# Patient Record
Sex: FEMALE | Race: WHITE | NOT HISPANIC OR LATINO | Employment: OTHER | URBAN - METROPOLITAN AREA
[De-identification: names, ages, dates, MRNs, and addresses within clinical notes are randomized per-mention and may not be internally consistent; named-entity substitution may affect disease eponyms.]

---

## 2017-02-08 ENCOUNTER — HOSPITAL ENCOUNTER (OUTPATIENT)
Facility: MEDICAL CENTER | Age: 62
End: 2017-02-08
Attending: NURSE PRACTITIONER
Payer: MEDICARE

## 2017-02-08 LAB
ANISOCYTOSIS BLD QL SMEAR: ABNORMAL
APPEARANCE UR: CLEAR
BACTERIA #/AREA URNS HPF: ABNORMAL /HPF
BASOPHILS # BLD AUTO: 0.04 K/UL (ref 0–0.12)
BASOPHILS NFR BLD AUTO: 0.6 % (ref 0–1.8)
BILIRUB UR QL STRIP.AUTO: NEGATIVE
BNP SERPL-MCNC: 24 PG/ML (ref 0–100)
COLOR UR AUTO: YELLOW
COMMENT 1642: NORMAL
CULTURE IF INDICATED INDCX: YES UA CULTURE
EOSINOPHIL # BLD: 0.13 K/UL (ref 0–0.51)
EOSINOPHIL NFR BLD AUTO: 1.9 % (ref 0–6.9)
EPITHELIAL CELLS 1715: ABNORMAL /HPF
ERYTHROCYTE [DISTWIDTH] IN BLOOD BY AUTOMATED COUNT: 37.6 FL (ref 35.9–50)
FERRITIN SERPL-MCNC: 148.7 NG/ML (ref 10–291)
GIANT PLATELETS BLD QL SMEAR: NORMAL
GLUCOSE UR STRIP.AUTO-MCNC: NEGATIVE MG/DL
HCT VFR BLD AUTO: 44.2 % (ref 37–47)
HGB BLD-MCNC: 13.1 G/DL (ref 12–16)
IMM GRANULOCYTES # BLD AUTO: 0.06 K/UL (ref 0–0.11)
IMM GRANULOCYTES NFR BLD AUTO: 0.9 % (ref 0–0.9)
IRON SATN MFR SERPL: 19 % (ref 15–55)
IRON SERPL-MCNC: 72 UG/DL (ref 40–170)
KETONES UR STRIP.AUTO-MCNC: NEGATIVE MG/DL
LEUKOCYTE ESTERASE UR QL STRIP.AUTO: ABNORMAL
LG PLATELETS BLD QL SMEAR: NORMAL
LYMPHOCYTES # BLD: 1.37 K/UL (ref 1–4.8)
LYMPHOCYTES NFR BLD AUTO: 19.6 % (ref 22–41)
MCH RBC QN AUTO: 20 PG (ref 27–33)
MCHC RBC AUTO-ENTMCNC: 29.6 G/DL (ref 33.6–35)
MCV RBC AUTO: 67.4 FL (ref 81.4–97.8)
MICRO URNS: ABNORMAL
MICROCYTES BLD QL SMEAR: ABNORMAL
MONOCYTES # BLD: 0.48 K/UL (ref 0–0.85)
MONOCYTES NFR BLD AUTO: 6.9 % (ref 0–13.4)
MORPHOLOGY BLD-IMP: NORMAL
NEUTROPHILS # BLD: 4.91 K/UL (ref 2–7.15)
NEUTROPHILS NFR BLD AUTO: 70.1 % (ref 44–72)
NITRITE UR QL STRIP.AUTO: NEGATIVE
NRBC # BLD AUTO: 0 K/UL
NRBC BLD-RTO: 0 /100 WBC
OVALOCYTES BLD QL SMEAR: NORMAL
PH UR: 6 [PH]
PLATELET # BLD AUTO: 224 K/UL (ref 164–446)
PLATELET BLD QL SMEAR: NORMAL
PMV BLD AUTO: 10 FL (ref 9–12.9)
POIKILOCYTOSIS BLD QL SMEAR: NORMAL
PROT UR QL STRIP: NEGATIVE MG/DL
RBC # BLD AUTO: 6.56 M/UL (ref 4.2–5.4)
RBC #/AREA URNS HPF: ABNORMAL /HPF
RBC BLD AUTO: PRESENT
RBC UR QL AUTO: NEGATIVE
SP GR UR STRIP.AUTO: 1.02
T3 SERPL-MCNC: 79.7 NG/DL (ref 60–181)
T4 FREE SERPL-MCNC: 0.82 NG/DL (ref 0.53–1.43)
TIBC SERPL-MCNC: 385 UG/DL (ref 250–450)
TRANS CELLS URNS QL MICRO: ABNORMAL /HPF
TRANSFERRIN SERPL-MCNC: 277 MG/DL (ref 200–370)
TSH SERPL DL<=0.005 MIU/L-ACNC: 4.44 UIU/ML (ref 0.3–3.7)
WBC # BLD AUTO: 7 K/UL (ref 4.8–10.8)
WBC #/AREA URNS HPF: ABNORMAL /HPF

## 2017-02-08 PROCEDURE — 83550 IRON BINDING TEST: CPT

## 2017-02-08 PROCEDURE — 84443 ASSAY THYROID STIM HORMONE: CPT

## 2017-02-08 PROCEDURE — 82728 ASSAY OF FERRITIN: CPT

## 2017-02-08 PROCEDURE — 84480 ASSAY TRIIODOTHYRONINE (T3): CPT

## 2017-02-08 PROCEDURE — 83540 ASSAY OF IRON: CPT

## 2017-02-08 PROCEDURE — 87086 URINE CULTURE/COLONY COUNT: CPT

## 2017-02-08 PROCEDURE — 85025 COMPLETE CBC W/AUTO DIFF WBC: CPT

## 2017-02-08 PROCEDURE — 81001 URINALYSIS AUTO W/SCOPE: CPT

## 2017-02-08 PROCEDURE — 83880 ASSAY OF NATRIURETIC PEPTIDE: CPT

## 2017-02-08 PROCEDURE — 84439 ASSAY OF FREE THYROXINE: CPT

## 2017-02-08 PROCEDURE — 36415 COLL VENOUS BLD VENIPUNCTURE: CPT

## 2017-02-08 PROCEDURE — 84466 ASSAY OF TRANSFERRIN: CPT

## 2017-02-10 ENCOUNTER — HOSPITAL ENCOUNTER (OUTPATIENT)
Facility: MEDICAL CENTER | Age: 62
End: 2017-02-10
Attending: NURSE PRACTITIONER
Payer: MEDICARE

## 2017-02-10 LAB
25(OH)D3 SERPL-MCNC: 40 NG/ML (ref 30–100)
ALBUMIN SERPL BCP-MCNC: 4.3 G/DL (ref 3.2–4.9)
ALBUMIN/GLOB SERPL: 1.4 G/DL
ALP SERPL-CCNC: 75 U/L (ref 30–99)
ALT SERPL-CCNC: 15 U/L (ref 2–50)
ANION GAP SERPL CALC-SCNC: 7 MMOL/L (ref 0–11.9)
AST SERPL-CCNC: 16 U/L (ref 12–45)
BACTERIA UR CULT: NORMAL
BILIRUB SERPL-MCNC: 0.7 MG/DL (ref 0.1–1.5)
BUN SERPL-MCNC: 20 MG/DL (ref 8–22)
CALCIUM SERPL-MCNC: 9.8 MG/DL (ref 8.5–10.5)
CHLORIDE SERPL-SCNC: 104 MMOL/L (ref 96–112)
CHOLEST SERPL-MCNC: 233 MG/DL (ref 100–199)
CO2 SERPL-SCNC: 28 MMOL/L (ref 20–33)
CREAT SERPL-MCNC: 0.87 MG/DL (ref 0.5–1.4)
CREAT UR-MCNC: 99.8 MG/DL
EST. AVERAGE GLUCOSE BLD GHB EST-MCNC: 131 MG/DL
FOLATE SERPL-MCNC: >23.6 NG/ML
GLOBULIN SER CALC-MCNC: 3 G/DL (ref 1.9–3.5)
GLUCOSE SERPL-MCNC: 112 MG/DL (ref 65–99)
HBA1C MFR BLD: 6.2 % (ref 0–5.6)
HDLC SERPL-MCNC: 44 MG/DL
LDLC SERPL CALC-MCNC: 153 MG/DL
MAGNESIUM SERPL-MCNC: 2 MG/DL (ref 1.5–2.5)
MICROALBUMIN UR-MCNC: 4.1 MG/DL
MICROALBUMIN/CREAT UR: 41 MG/G (ref 0–30)
POTASSIUM SERPL-SCNC: 4.3 MMOL/L (ref 3.6–5.5)
PROT SERPL-MCNC: 7.3 G/DL (ref 6–8.2)
SIGNIFICANT IND 70042: NORMAL
SODIUM SERPL-SCNC: 139 MMOL/L (ref 135–145)
SOURCE SOURCE: NORMAL
TRIGL SERPL-MCNC: 179 MG/DL (ref 0–149)
VIT B12 SERPL-MCNC: >1500 PG/ML (ref 211–911)

## 2017-02-10 PROCEDURE — 82746 ASSAY OF FOLIC ACID SERUM: CPT

## 2017-02-10 PROCEDURE — 82306 VITAMIN D 25 HYDROXY: CPT

## 2017-02-10 PROCEDURE — 83735 ASSAY OF MAGNESIUM: CPT

## 2017-02-10 PROCEDURE — 82043 UR ALBUMIN QUANTITATIVE: CPT

## 2017-02-10 PROCEDURE — 80053 COMPREHEN METABOLIC PANEL: CPT

## 2017-02-10 PROCEDURE — 82607 VITAMIN B-12: CPT

## 2017-02-10 PROCEDURE — 80061 LIPID PANEL: CPT

## 2017-02-10 PROCEDURE — 84425 ASSAY OF VITAMIN B-1: CPT

## 2017-02-10 PROCEDURE — 82570 ASSAY OF URINE CREATININE: CPT

## 2017-02-10 PROCEDURE — 83036 HEMOGLOBIN GLYCOSYLATED A1C: CPT

## 2017-02-14 LAB — VIT B1 BLD-MCNC: 153 NMOL/L (ref 70–180)

## 2017-07-06 ENCOUNTER — HOSPITAL ENCOUNTER (EMERGENCY)
Facility: MEDICAL CENTER | Age: 62
End: 2017-07-06
Attending: EMERGENCY MEDICINE
Payer: MEDICARE

## 2017-07-06 VITALS
BODY MASS INDEX: 31.41 KG/M2 | RESPIRATION RATE: 16 BRPM | OXYGEN SATURATION: 97 % | TEMPERATURE: 97.6 F | HEART RATE: 88 BPM | DIASTOLIC BLOOD PRESSURE: 80 MMHG | SYSTOLIC BLOOD PRESSURE: 152 MMHG | HEIGHT: 60 IN | WEIGHT: 160 LBS
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
## 2017-07-06 PROCEDURE — 99283 EMERGENCY DEPT VISIT LOW MDM: CPT

## 2017-07-06 RX ORDER — BENZOCAINE/MENTHOL 6 MG-10 MG
1 LOZENGE MUCOUS MEMBRANE 2 TIMES DAILY
Qty: 1 TUBE | Refills: 0 | Status: SHIPPED | OUTPATIENT
Start: 2017-07-06 | End: 2018-05-10

## 2017-07-06 RX ORDER — CEFDINIR 300 MG/1
300 CAPSULE ORAL 2 TIMES DAILY
Qty: 14 CAP | Refills: 0 | Status: SHIPPED | OUTPATIENT
Start: 2017-07-06 | End: 2017-07-13

## 2017-07-06 ASSESSMENT — PAIN SCALES - GENERAL
PAINLEVEL_OUTOF10: 1
PAINLEVEL_OUTOF10: 0

## 2017-07-06 NOTE — ED PROVIDER NOTES
ED Provider Note    Scribed for Tabitha Ruiz M.D. by Gordon Vasquez. 7/6/2017  8:52 AM    Primary care provider: No primary care provider on file.  Means of arrival: walk in  History obtained from: patient  History limited by: none    CHIEF COMPLAINT  Chief Complaint   Patient presents with   • Bug Bite       HPI  Liliana Cifuentes is a 62 y.o. female who presents to the Emergency Department complaining of bug bites diffusely across her body starting 2 days ago. She reports associated mass localized to her left lower extremity. Patient states that she had her furniture moved into another part of the building that is suspected of having bed bugs. She states that after her furniture was returned, she woke up noting lesions diffusely across her body. Patient denies shortness of breath.     REVIEW OF SYSTEMS  HEENT:  No ear pain, congestion, or sore throat   PULMONARY: no dyspnea, cough, or congestion   Musculoskeletal: no swelling, deformity, pain, or joint swelling  Endocrine: no fevers, sweating, or weight loss   SKIN: Positive rash. No contusions     See history of present illness.   E.    PAST MEDICAL HISTORY   None noted    SURGICAL HISTORY  patient denies any surgical history    SOCIAL HISTORY  None noted     FAMILY HISTORY  No family history on file.    CURRENT MEDICATIONS  No current facility-administered medications for this encounter.    Current outpatient prescriptions:   •  cefdinir (OMNICEF) 300 MG Cap, Take 1 Cap by mouth 2 times a day for 7 days., Disp: 14 Cap, Rfl: 0  •  hydrocortisone 1 % Cream, Apply 1 Each to affected area(s) 2 times a day., Disp: 1 Tube, Rfl: 0    ALLERGIES  None noted    PHYSICAL EXAM  VITAL SIGNS: /80 mmHg  Pulse 88  Temp(Src) 36.4 °C (97.6 °F) (Temporal)  Resp 16  Ht 1.524 m (5')  Wt 72.576 kg (160 lb)  BMI 31.25 kg/m2  SpO2 97%    Constitutional: Well developed, Well nourished, No acute distress, Non-toxic appearance.   HEENT: Normocephalic, Atraumatic,  external  ears normal, pharynx pink,  Mucous  Membranes moist, No rhinorrhea or mucosal edema  Eyes: PERRL, EOMI, Conjunctiva normal, No discharge.   Neck: Normal range of motion, No tenderness, Supple, No stridor.   Lymphatic: No lymphadenopathy    Skin: Warm, Dry, Multiple areas of red raised lesions that are consistent with bed bugs. Bilateral posterior lower thighs with bite marks with left side swelling no erythema, fluctuants, streaking. Right posterior thigh without erythema.   Extremities: Equal, intact distal pulses, No cyanosis, clubbing or edema,  No tenderness. Multiple areas of red raised lesions that are consistent with bed bugs. Bilateral posterior lower thighs with bite marks with left side swelling no erythema, fluctuants, streaking. Right posterior thigh without erythema.   Musculoskeletal: Good range of motion in all major joints. No tenderness to palpation or major deformities noted.     DIAGNOSTIC STUDIES / PROCEDURES    COURSE & MEDICAL DECISION MAKING  Nursing notes, VS, PMSFHx reviewed in chart.    8:52 AM - Patient seen and examined at bedside. I counseled the patient on getting her residence fumigated to avoid further bites. I provided instructions on administering her discharge antibiotics and steroid cream. Patient verbalizes understanding and agreement to this plan of care.     The patient will return for new or worsening symptoms and is stable at the time of discharge.    The patient is referred to a primary physician for blood pressure management, diabetic screening, and for all other preventative health concerns.    DISPOSITION:  Patient will be discharged home in stable condition.    FOLLOW UP:  [facility] Emergency Dept  [address] [facility] [address] [phone]    As needed, If symptoms worsen    [address] [address] [phone]  Call in 1 day  for recheck      OUTPATIENT MEDICATIONS:  Discharge Medication List as  of 7/6/2017  9:19 AM      START taking these medications    Details   cefdinir (OMNICEF) 300 MG Cap Take 1 Cap by mouth 2 times a day for 7 days., Disp-14 Cap, R-0, Print Rx Paper      hydrocortisone 1 % Cream Apply 1 Each to affected area(s) 2 times a day., Disp-1 Tube, R-0, Print Rx Paper                 FINAL IMPRESSION  1. Bug bites, initial encounter          I, Gordon Vasquez (Scribe), am scribing for, and in the presence of, Tabitha Ruiz M.D..    Electronically signed by: Gordon Vasquez (Scribe), 7/6/2017    I, Tabitha uRiz M.D. personally performed the services described in this documentation, as scribed by Gordon Vasquez in my presence, and it is both accurate and complete.    The note accurately reflects work and decisions made by me.  Tabitha Ruiz  7/6/2017  1:32 PM  \

## 2017-07-06 NOTE — DISCHARGE INSTRUCTIONS
Insect Bite  Mosquitoes, flies, fleas, bedbugs, and many other insects can bite. Insect bites are different from insect stings. A sting is when venom is injected into the skin. Some insect bites can transmit infectious diseases.  SYMPTOMS   Insect bites usually turn red, swell, and itch for 2 to 4 days. They often go away on their own.  TREATMENT   Your caregiver may prescribe antibiotic medicines if a bacterial infection develops in the bite.  HOME CARE INSTRUCTIONS  · Do not scratch the bite area.  · Keep the bite area clean and dry. Wash the bite area thoroughly with soap and water.  · Put ice or cool compresses on the bite area.  ¨ Put ice in a plastic bag.  ¨ Place a towel between your skin and the bag.  ¨ Leave the ice on for 20 minutes, 4 times a day for the first 2 to 3 days, or as directed.  · You may apply a baking soda paste, cortisone cream, or calamine lotion to the bite area as directed by your caregiver. This can help reduce itching and swelling.  · Only take over-the-counter or prescription medicines as directed by your caregiver.  · If you are given antibiotics, take them as directed. Finish them even if you start to feel better.  You may need a tetanus shot if:  · You cannot remember when you had your last tetanus shot.  · You have never had a tetanus shot.  · The injury broke your skin.  If you get a tetanus shot, your arm may swell, get red, and feel warm to the touch. This is common and not a problem. If you need a tetanus shot and you choose not to have one, there is a rare chance of getting tetanus. Sickness from tetanus can be serious.  SEEK IMMEDIATE MEDICAL CARE IF:   · You have increased pain, redness, or swelling in the bite area.  · You see a red line on the skin coming from the bite.  · You have a fever.  · You have joint pain.  · You have a headache or neck pain.  · You have unusual weakness.  · You have a rash.  · You have chest pain or shortness of breath.  · You have abdominal pain,  nausea, or vomiting.  · You feel unusually tired or sleepy.  MAKE SURE YOU:   · Understand these instructions.  · Will watch your condition.  · Will get help right away if you are not doing well or get worse.     This information is not intended to replace advice given to you by your health care provider. Make sure you discuss any questions you have with your health care provider.     Document Released: 01/25/2006 Document Revised: 03/11/2013 Document Reviewed: 07/18/2012  [employer] Interactive Patient Education ©2016 Elsevier Inc.

## 2017-07-06 NOTE — ED AVS SNAPSHOT
7/6/2017    [facility]  [place] [address]    Dear [place]:    [facility] wants to ensure your discharge home is safe and you or your loved ones have had all of your questions answered regarding your care after you leave the hospital.    Below is a list of resources and contact information should you have any questions regarding your hospital stay, follow-up instructions, or active medical symptoms.    Questions or Concerns Regarding… Contact   Medical Questions Related to Your Discharge  (7 days a week, 8am-5pm) Contact a Nurse Care Coordinator   [phone]   Medical Questions Not Related to Your Discharge  (24 hours a day / 7 days a week)  Contact the Nurse Health Line   [phone]    Medications or Discharge Instructions Refer to your discharge packet   or contact your [facility] Primary Care Provider   [phone]   Follow-up Appointment(s) Schedule your appointment via Lanyon   or contact Scheduling [phone]   Billing Review your statement via Lanyon  or contact Billing [phone]   Medical Records Review your records via Lanyon   or contact Medical Records [phone]     You may receive a telephone call within two days of discharge. This call is to make certain you understand your discharge instructions and have the opportunity to have any questions answered. You can also easily access your medical information, test results and upcoming appointments via the Lanyon free online health management tool. You can learn more and sign up at [employer]/Lanyon. For assistance setting up your Lanyon account, please call [phone].    Once again, we want to ensure your discharge home is safe and that you have a clear understanding of any next steps in your care. If you have any questions or concerns, please do not hesitate to contact us, we are here for you. Thank you for choosing [facility] for your healthcare needs.    Sincerely,    Your [facility] Healthcare Team

## 2017-07-06 NOTE — ED AVS SNAPSHOT
Citelighter Access Code: [email or web address]  Expires: 8/5/2017  9:01 AM    Your email address is not on file at [facility].  Email Addresses are required for you to sign up for Citelighter, please contact [phone] to verify your personal information and to provide your email address prior to attempting to register for Citelighter.    [place] [address]    Storefrontt  A secure, online tool to manage your health information     [employer]’s Citelighter® is a secure, online tool that connects you to your personalized health information from the privacy of your home -- day or night - making it very easy for you to manage your healthcare. Once the activation process is completed, you can even access your medical information using the Citelighter hao, which is available for free in the Apple Hao store or Google Play store.     To learn more about Citelighter, visit [email or web address]    There are two levels of access available (as shown below):   My Chart Features  [facility] Primary Care Doctor [facility]  Specialists [facility]  Urgent  Care Non-[facility] Primary Care Doctor   Email your healthcare team securely and privately 24/7 X X X    Manage appointments: schedule your next appointment; view details of past/upcoming appointments X      Request prescription refills. X      View recent personal medical records, including lab and immunizations X X X X   View health record, including health history, allergies, medications X X X X   Read reports about your outpatient visits, procedures, consult and ER notes X X X X   See your discharge summary, which is a recap of your hospital and/or ER visit that includes your diagnosis, lab results, and care plan X X  X     How to register for Storefrontt:  Once your e-mail address has been verified, follow the following steps to sign up for Storefrontt.     1. Go to  [email or web address]  2. Click on the Sign Up Now box, which takes you to the New Member Sign Up page. You will  need to provide the following information:  a. Enter your [employer] Access Code exactly as it appears at the top of this page. (You will not need to use this code after you’ve completed the sign-up process. If you do not sign up before the expiration date, you must request a new code.)   b. Enter your date of birth.   c. Enter your home email address.   d. Click Submit, and follow the next screen’s instructions.  3. Create a [employer] ID. This will be your [employer] login ID and cannot be changed, so think of one that is secure and easy to remember.  4. Create a [employer] password. You can change your password at any time.  5. Enter your Password Reset Question and Answer. This can be used at a later time if you forget your password.   6. Enter your e-mail address. This allows you to receive e-mail notifications when new information is available in [employer].  7. Click Sign Up. You can now view your health information.    For assistance activating your [employer] account, call [phone]

## 2017-07-06 NOTE — ED NOTES
Pt c/o bed bugs bites. Pt reports seeing bed bugs at her house since new furniture arrived. Pt advised to return to triage nurse for any changes or concerns.

## 2017-07-06 NOTE — ED AVS SNAPSHOT
Home Care Instructions                                                                                                                Liliana Cifuentes   MRN: [id number]    Department:  [facility], Emergency Dept   Date of Visit:  7/6/2017            [facility], Emergency Dept    [address] [facility] [address]    Phone:  [phone]      You were seen by     Tabitha Ruiz M.D.      Your Diagnosis Was     Bug bites, initial encounter     W57.XXXA       Follow-up Information     1. Follow up with [facility], Emergency Dept.    Specialty:  Emergency Medicine    Why:  As needed, If symptoms worsen    Contact information    [address] [facility] [address] [phone]        2. Follow up with [facility]. Call in 1 day.    Why:  for recheck    Contact information    [address] [place] [address] [phone]      Medication Information     Review all of your home medications and newly ordered medications with your primary doctor and/or pharmacist as soon as possible. Follow medication instructions as directed by your doctor and/or pharmacist.     Please keep your complete medication list with you and share with your physician. Update the information when medications are discontinued, doses are changed, or new medications (including over-the-counter products) are added; and carry medication information at all times in the event of emergency situations.               Medication List      START taking these medications        Instructions    Morning Afternoon Evening Bedtime    cefdinir 300 MG Caps   Commonly known as:  OMNICEF        Take 1 Cap by mouth 2 times a day for 7 days.   Dose:  300 mg                        hydrocortisone 1 % Crea        Apply 1 Each to affected area(s) 2 times a day.   Dose:  1 Each                             Where to Get Your Medications      You can get these medications from any pharmacy     Bring a paper prescription for each of these medications    - cefdinir 300 MG Caps  - hydrocortisone 1 % Crea              Discharge Instructions       Insect Bite  Mosquitoes, flies, fleas, bedbugs, and many other insects can bite. Insect bites are different from insect stings. A sting is when venom is injected into the skin. Some insect bites can transmit infectious diseases.  SYMPTOMS   Insect bites usually turn red, swell, and itch for 2 to 4 days. They often go away on their own.  TREATMENT   Your caregiver may prescribe antibiotic medicines if a bacterial infection develops in the bite.  HOME CARE INSTRUCTIONS  · Do not scratch the bite area.  · Keep the bite area clean and dry. Wash the bite area thoroughly with soap and water.  · Put ice or cool compresses on the bite area.  ¨ Put ice in a plastic bag.  ¨ Place a towel between your skin and the bag.  ¨ Leave the ice on for 20 minutes, 4 times a day for the first 2 to 3 days, or as directed.  · You may apply a baking soda paste, cortisone cream, or calamine lotion to the bite area as directed by your caregiver. This can help reduce itching and swelling.  · Only take over-the-counter or prescription medicines as directed by your caregiver.  · If you are given antibiotics, take them as directed. Finish them even if you start to feel better.  You may need a tetanus shot if:  · You cannot remember when you had your last tetanus shot.  · You have never had a tetanus shot.  · The injury broke your skin.  If you get a tetanus shot, your arm may swell, get red, and feel warm to the touch. This is common and not a problem. If you need a tetanus shot and you choose not to have one, there is a rare chance of getting tetanus. Sickness from tetanus can be serious.  SEEK IMMEDIATE MEDICAL CARE IF:   · You have increased pain, redness, or swelling in the bite area.  · You see a red line on the skin coming from the bite.  · You have a fever.  · You have joint pain.  · You  have a headache or neck pain.  · You have unusual weakness.  · You have a rash.  · You have chest pain or shortness of breath.  · You have abdominal pain, nausea, or vomiting.  · You feel unusually tired or sleepy.  MAKE SURE YOU:   · Understand these instructions.  · Will watch your condition.  · Will get help right away if you are not doing well or get worse.     This information is not intended to replace advice given to you by your health care provider. Make sure you discuss any questions you have with your health care provider.     Document Released: 01/25/2006 Document Revised: 03/11/2013 Document Reviewed: 07/18/2012  [employer] Interactive Patient Education ©2016 Elsevier Inc.            Patient Information     Patient Information    Following emergency treatment: all patient requiring follow-up care must return either to a private physician or a clinic if your condition worsens before you are able to obtain further medical attention, please return to the emergency room.     Billing Information    At [facility], we work to make the billing process streamlined for our patients.  Our Representatives are here to answer any questions you may have regarding your hospital bill.  If you have insurance coverage and have supplied your insurance information to us, we will submit a claim to your insurer on your behalf.  Should you have any questions regarding your bill, we can be reached online or by phone as follows:  Online: You are able pay your bills online or live chat with our representatives about any billing questions you may have. We are here to help Monday - Friday from 8:00am to 7:30pm and 9:00am - 12:00pm on Saturdays.  Please visit [email or web address]  for more information.   Phone:  [phone] or [phone]    Please note that your emergency physician, surgeon, pathologist, radiologist, anesthesiologist, and other specialists are not employed by [facility] and will  therefore bill separately for their services.  Please contact them directly for any questions concerning their bills at the numbers below:     Emergency Physician Services:  [phone]  [facility]:  [phone]  [facility]:  [phone]  [facility]:  [phone]    1. Your final bill may vary from the amount quoted upon discharge if all procedures are not complete at that time, or if your doctor has additional procedures of which we are not aware. You will receive an additional bill if you return to the Emergency Department at [facility] for suture removal regardless of the facility of which the sutures were placed.     2. Please arrange for settlement of this account at the emergency registration.    3. All self-pay accounts are due in full at the time of treatment.  If you are unable to meet this obligation then payment is expected within 4-5 days.     4. If you have had radiology studies (CT, X-ray, Ultrasound, MRI), you have received a preliminary result during your emergency department visit. Please contact the radiology department [phone] to receive a copy of your final result. Please discuss the Final result with your primary physician or with the follow up physician provided.     Crisis Hotline:  [place] Crisis Hotline:  7-404-WJNCYAH or 1-139.447.3723  [place] Crisis Hotline:    1-668.182.3457 or 189-274-0359         ED Discharge Follow Up Questions    1. In order to provide you with very good care, we would like to follow up with a phone call in the next few days.  May we have your permission to contact you?     YES /  NO    2. What is the best phone number to call you? (       )_____-__________    3. What is the best time to call you?      Morning  /  Afternoon  /  Evening                   Patient Signature:  ____________________________________________________________    Date:  ____________________________________________________________

## 2017-11-16 ENCOUNTER — HOSPITAL ENCOUNTER (OUTPATIENT)
Dept: LAB | Facility: MEDICAL CENTER | Age: 62
End: 2017-11-16
Attending: NURSE PRACTITIONER
Payer: MEDICARE

## 2017-11-16 LAB
25(OH)D3 SERPL-MCNC: 37 NG/ML (ref 30–100)
ANISOCYTOSIS BLD QL SMEAR: ABNORMAL
BASOPHILS # BLD AUTO: 1.2 % (ref 0–1.8)
BASOPHILS # BLD: 0.06 K/UL (ref 0–0.12)
COMMENT 1642: NORMAL
EOSINOPHIL # BLD AUTO: 0.14 K/UL (ref 0–0.51)
EOSINOPHIL NFR BLD: 2.8 % (ref 0–6.9)
ERYTHROCYTE [DISTWIDTH] IN BLOOD BY AUTOMATED COUNT: 38 FL (ref 35.9–50)
HCT VFR BLD AUTO: 41.2 % (ref 37–47)
HGB BLD-MCNC: 12.3 G/DL (ref 12–16)
IMM GRANULOCYTES # BLD AUTO: 0.03 K/UL (ref 0–0.11)
IMM GRANULOCYTES NFR BLD AUTO: 0.6 % (ref 0–0.9)
LG PLATELETS BLD QL SMEAR: NORMAL
LYMPHOCYTES # BLD AUTO: 1.14 K/UL (ref 1–4.8)
LYMPHOCYTES NFR BLD: 23 % (ref 22–41)
MCH RBC QN AUTO: 19.9 PG (ref 27–33)
MCHC RBC AUTO-ENTMCNC: 29.9 G/DL (ref 33.6–35)
MCV RBC AUTO: 66.6 FL (ref 81.4–97.8)
MICROCYTES BLD QL SMEAR: ABNORMAL
MONOCYTES # BLD AUTO: 0.31 K/UL (ref 0–0.85)
MONOCYTES NFR BLD AUTO: 6.3 % (ref 0–13.4)
MORPHOLOGY BLD-IMP: NORMAL
NEUTROPHILS # BLD AUTO: 3.28 K/UL (ref 2–7.15)
NEUTROPHILS NFR BLD: 66.1 % (ref 44–72)
NRBC # BLD AUTO: 0 K/UL
NRBC BLD AUTO-RTO: 0 /100 WBC
OVALOCYTES BLD QL SMEAR: NORMAL
PLATELET # BLD AUTO: 215 K/UL (ref 164–446)
PLATELET BLD QL SMEAR: NORMAL
PMV BLD AUTO: 10.3 FL (ref 9–12.9)
POIKILOCYTOSIS BLD QL SMEAR: NORMAL
RBC # BLD AUTO: 6.19 M/UL (ref 4.2–5.4)
RBC BLD AUTO: PRESENT
T3FREE SERPL-MCNC: 2.97 PG/ML (ref 2.4–4.2)
T4 FREE SERPL-MCNC: 0.8 NG/DL (ref 0.53–1.43)
T4 SERPL-MCNC: 6.1 UG/DL (ref 4–12)
TSH SERPL DL<=0.005 MIU/L-ACNC: 4.72 UIU/ML (ref 0.3–3.7)
WBC # BLD AUTO: 5 K/UL (ref 4.8–10.8)

## 2017-11-16 PROCEDURE — 84481 FREE ASSAY (FT-3): CPT

## 2017-11-16 PROCEDURE — 82306 VITAMIN D 25 HYDROXY: CPT

## 2017-11-16 PROCEDURE — 36415 COLL VENOUS BLD VENIPUNCTURE: CPT

## 2017-11-16 PROCEDURE — 84439 ASSAY OF FREE THYROXINE: CPT

## 2017-11-16 PROCEDURE — 84443 ASSAY THYROID STIM HORMONE: CPT

## 2017-11-16 PROCEDURE — 85025 COMPLETE CBC W/AUTO DIFF WBC: CPT

## 2018-02-14 ENCOUNTER — APPOINTMENT (OUTPATIENT)
Dept: LAB | Facility: MEDICAL CENTER | Age: 63
End: 2018-02-14
Attending: FAMILY MEDICINE
Payer: MEDICARE

## 2018-04-27 ENCOUNTER — HOSPITAL ENCOUNTER (OUTPATIENT)
Dept: LAB | Facility: MEDICAL CENTER | Age: 63
End: 2018-04-27
Attending: NURSE PRACTITIONER
Payer: MEDICARE

## 2018-04-27 LAB
25(OH)D3 SERPL-MCNC: 43 NG/ML (ref 30–100)
ALBUMIN SERPL BCP-MCNC: 4.3 G/DL (ref 3.2–4.9)
ALBUMIN/GLOB SERPL: 1.7 G/DL
ALP SERPL-CCNC: 91 U/L (ref 30–99)
ALT SERPL-CCNC: 17 U/L (ref 2–50)
ANION GAP SERPL CALC-SCNC: 8 MMOL/L (ref 0–11.9)
AST SERPL-CCNC: 17 U/L (ref 12–45)
BASOPHILS # BLD AUTO: 1 % (ref 0–1.8)
BASOPHILS # BLD: 0.05 K/UL (ref 0–0.12)
BILIRUB SERPL-MCNC: 0.9 MG/DL (ref 0.1–1.5)
BUN SERPL-MCNC: 18 MG/DL (ref 8–22)
CALCIUM SERPL-MCNC: 9.8 MG/DL (ref 8.5–10.5)
CHLORIDE SERPL-SCNC: 107 MMOL/L (ref 96–112)
CHOLEST SERPL-MCNC: 187 MG/DL (ref 100–199)
CO2 SERPL-SCNC: 26 MMOL/L (ref 20–33)
COMMENT 1642: NORMAL
CREAT SERPL-MCNC: 0.78 MG/DL (ref 0.5–1.4)
CREAT UR-MCNC: 222.5 MG/DL
EOSINOPHIL # BLD AUTO: 0.13 K/UL (ref 0–0.51)
EOSINOPHIL NFR BLD: 2.7 % (ref 0–6.9)
ERYTHROCYTE [DISTWIDTH] IN BLOOD BY AUTOMATED COUNT: 38.5 FL (ref 35.9–50)
EST. AVERAGE GLUCOSE BLD GHB EST-MCNC: 131 MG/DL
GLOBULIN SER CALC-MCNC: 2.6 G/DL (ref 1.9–3.5)
GLUCOSE SERPL-MCNC: 101 MG/DL (ref 65–99)
HBA1C MFR BLD: 6.2 % (ref 0–5.6)
HCT VFR BLD AUTO: 42.6 % (ref 37–47)
HDLC SERPL-MCNC: 42 MG/DL
HGB BLD-MCNC: 12.5 G/DL (ref 12–16)
IMM GRANULOCYTES # BLD AUTO: 0.02 K/UL (ref 0–0.11)
IMM GRANULOCYTES NFR BLD AUTO: 0.4 % (ref 0–0.9)
LDLC SERPL CALC-MCNC: 114 MG/DL
LYMPHOCYTES # BLD AUTO: 1.26 K/UL (ref 1–4.8)
LYMPHOCYTES NFR BLD: 26.1 % (ref 22–41)
MCH RBC QN AUTO: 19.6 PG (ref 27–33)
MCHC RBC AUTO-ENTMCNC: 29.3 G/DL (ref 33.6–35)
MCV RBC AUTO: 66.8 FL (ref 81.4–97.8)
MICROALBUMIN UR-MCNC: 4.9 MG/DL
MICROALBUMIN/CREAT UR: 22 MG/G (ref 0–30)
MICROCYTES BLD QL SMEAR: ABNORMAL
MONOCYTES # BLD AUTO: 0.45 K/UL (ref 0–0.85)
MONOCYTES NFR BLD AUTO: 9.3 % (ref 0–13.4)
MORPHOLOGY BLD-IMP: NORMAL
NEUTROPHILS # BLD AUTO: 2.92 K/UL (ref 2–7.15)
NEUTROPHILS NFR BLD: 60.5 % (ref 44–72)
NRBC # BLD AUTO: 0 K/UL
NRBC BLD-RTO: 0 /100 WBC
OVALOCYTES BLD QL SMEAR: NORMAL
PLATELET # BLD AUTO: 198 K/UL (ref 164–446)
PLATELET BLD QL SMEAR: NORMAL
PMV BLD AUTO: 10.3 FL (ref 9–12.9)
POIKILOCYTOSIS BLD QL SMEAR: NORMAL
POTASSIUM SERPL-SCNC: 4.4 MMOL/L (ref 3.6–5.5)
PROT SERPL-MCNC: 6.9 G/DL (ref 6–8.2)
RBC # BLD AUTO: 6.38 M/UL (ref 4.2–5.4)
RBC BLD AUTO: PRESENT
SODIUM SERPL-SCNC: 141 MMOL/L (ref 135–145)
T3FREE SERPL-MCNC: 3.77 PG/ML (ref 2.4–4.2)
T4 FREE SERPL-MCNC: 1.56 NG/DL (ref 0.53–1.43)
TRIGL SERPL-MCNC: 153 MG/DL (ref 0–149)
TSH SERPL DL<=0.005 MIU/L-ACNC: 0.01 UIU/ML (ref 0.38–5.33)
WBC # BLD AUTO: 4.8 K/UL (ref 4.8–10.8)

## 2018-04-27 PROCEDURE — 85025 COMPLETE CBC W/AUTO DIFF WBC: CPT

## 2018-04-27 PROCEDURE — 84481 FREE ASSAY (FT-3): CPT

## 2018-04-27 PROCEDURE — 82570 ASSAY OF URINE CREATININE: CPT

## 2018-04-27 PROCEDURE — 80061 LIPID PANEL: CPT

## 2018-04-27 PROCEDURE — 82306 VITAMIN D 25 HYDROXY: CPT

## 2018-04-27 PROCEDURE — 84439 ASSAY OF FREE THYROXINE: CPT

## 2018-04-27 PROCEDURE — 84443 ASSAY THYROID STIM HORMONE: CPT

## 2018-04-27 PROCEDURE — 83735 ASSAY OF MAGNESIUM: CPT

## 2018-04-27 PROCEDURE — 83036 HEMOGLOBIN GLYCOSYLATED A1C: CPT

## 2018-04-27 PROCEDURE — 36415 COLL VENOUS BLD VENIPUNCTURE: CPT

## 2018-04-27 PROCEDURE — 83785 ASSAY OF MANGANESE: CPT

## 2018-04-27 PROCEDURE — 80053 COMPREHEN METABOLIC PANEL: CPT

## 2018-04-27 PROCEDURE — 82043 UR ALBUMIN QUANTITATIVE: CPT

## 2018-04-30 LAB — MAGNESIUM RBC-SCNC: 2 MMOL/L (ref 1.5–3.1)

## 2018-05-01 LAB — TEST NAME 95000: NORMAL

## 2018-05-10 ENCOUNTER — OFFICE VISIT (OUTPATIENT)
Dept: MEDICAL GROUP | Facility: MEDICAL CENTER | Age: 63
End: 2018-05-10
Payer: MEDICARE

## 2018-05-10 VITALS
HEIGHT: 60 IN | TEMPERATURE: 98.6 F | OXYGEN SATURATION: 94 % | DIASTOLIC BLOOD PRESSURE: 98 MMHG | SYSTOLIC BLOOD PRESSURE: 164 MMHG | RESPIRATION RATE: 16 BRPM | HEART RATE: 78 BPM | WEIGHT: 184 LBS | BODY MASS INDEX: 36.12 KG/M2

## 2018-05-10 DIAGNOSIS — D56.9 THALASSEMIA, UNSPECIFIED TYPE: ICD-10-CM

## 2018-05-10 DIAGNOSIS — I10 ESSENTIAL HYPERTENSION: ICD-10-CM

## 2018-05-10 DIAGNOSIS — K04.7 DENTAL ABSCESS: ICD-10-CM

## 2018-05-10 DIAGNOSIS — R73.03 PRE-DIABETES: ICD-10-CM

## 2018-05-10 DIAGNOSIS — E03.9 HYPOTHYROIDISM, UNSPECIFIED TYPE: ICD-10-CM

## 2018-05-10 PROCEDURE — 99204 OFFICE O/P NEW MOD 45 MIN: CPT | Performed by: NURSE PRACTITIONER

## 2018-05-10 RX ORDER — LEVOTHYROXINE SODIUM 175 UG/1
175 TABLET ORAL
Qty: 30 TAB | Refills: 3 | Status: SHIPPED | OUTPATIENT
Start: 2018-05-10 | End: 2018-09-19 | Stop reason: SDUPTHER

## 2018-05-10 RX ORDER — LEVOTHYROXINE SODIUM 0.2 MG/1
200 TABLET ORAL
COMMUNITY
End: 2018-05-10

## 2018-05-10 RX ORDER — AMOXICILLIN 500 MG/1
500 CAPSULE ORAL 3 TIMES DAILY
Qty: 30 CAP | Refills: 0 | Status: SHIPPED | OUTPATIENT
Start: 2018-05-10 | End: 2021-04-19

## 2018-05-10 RX ORDER — LISINOPRIL 20 MG/1
20 TABLET ORAL DAILY
Qty: 30 TAB | Refills: 3 | Status: SHIPPED | OUTPATIENT
Start: 2018-05-10 | End: 2019-01-04 | Stop reason: SDUPTHER

## 2018-05-10 NOTE — LETTER
[facility]  Rhonda Valerio A.P.R.N.  [address]  [facility] [address]  Fax: [phone]   Authorization for Release/Disclosure of   Protected Health Information   Name: GAUTAM JEONG : 1955 SSN: [id number]   Address: [facility] [address] Phone:    [phone] (home)    I authorize the entity listed below to release/disclose the PHI below to:   [facility]/Rhonda Valerio, A.P.R.N. and Rhonda Valerio A.P.R.N.   Provider or Entity Name:  Dr. Bassem Mckeon   Address   City, [facility]   Phone:      Fax:     Reason for request: continuity of care   Information to be released:    [  ] LAST COLONOSCOPY,  including any PATH REPORT and follow-up  [  ] LAST FIT/COLOGUARD RESULT [  ] LAST DEXA  [  ] LAST MAMMOGRAM  [  ] LAST PAP  [  ] LAST LABS [  ] RETINA EXAM REPORT  [  ] IMMUNIZATION RECORDS  [ x ] Release all info      [  ] Check here and initial the line next to each item to release ALL health information INCLUDING  _____ Care and treatment for drug and / or alcohol abuse  _____ HIV testing, infection status, or AIDS  _____ Genetic Testing    DATES OF SERVICE OR TIME PERIOD TO BE DISCLOSED: _____________  I understand and acknowledge that:  * This Authorization may be revoked at any time by you in writing, except if your health information has already been used or disclosed.  * Your health information that will be used or disclosed as a result of you signing this authorization could be re-disclosed by the recipient. If this occurs, your re-disclosed health information may no longer be protected by State or Federal laws.  * You may refuse to sign this Authorization. Your refusal will not affect your ability to obtain treatment.  * This Authorization becomes effective upon signing and will  on (date) __________.      If no date is indicated, this Authorization will  one (1) year from the signature date.    Name: Gautam Velez  Esau    Signature:   Date:     5/10/2018       PLEASE FAX REQUESTED RECORDS BACK TO: [phone]

## 2018-05-11 ENCOUNTER — HOSPITAL ENCOUNTER (OUTPATIENT)
Dept: LAB | Facility: MEDICAL CENTER | Age: 63
End: 2018-05-11
Attending: NURSE PRACTITIONER
Payer: MEDICARE

## 2018-05-11 DIAGNOSIS — E03.9 HYPOTHYROIDISM, UNSPECIFIED TYPE: ICD-10-CM

## 2018-05-11 LAB
T4 FREE SERPL-MCNC: 1.33 NG/DL (ref 0.53–1.43)
TSH SERPL DL<=0.005 MIU/L-ACNC: 0.01 UIU/ML (ref 0.38–5.33)

## 2018-05-11 PROCEDURE — 84443 ASSAY THYROID STIM HORMONE: CPT

## 2018-05-11 PROCEDURE — 84439 ASSAY OF FREE THYROXINE: CPT

## 2018-05-11 PROCEDURE — 36415 COLL VENOUS BLD VENIPUNCTURE: CPT

## 2018-05-11 RX ORDER — MULTIVIT-MIN/IRON/FOLIC ACID/K 18-600-40
CAPSULE ORAL
Qty: 30 CAP | COMMUNITY
Start: 2018-05-11

## 2018-05-11 NOTE — PROGRESS NOTES
"CC: Establish care, thyroid medication refill      Liliana Cifuentes is a 63 y.o. female here to establish care and to discuss the evaluation and management of:    1. Dental abscess  Patient states that she has dental abscesses and has used amoxicillin in the past. States that she can't afford a dentist at this time. Requesting antibiotics. Denies any fever, chills, nausea or vomiting, difficulty swallowing or severe facial swelling.     2. Hypothyroidism, unspecified type  Patient states that she has hypothyroidism due to a partial lobectomy. Patient states that she likes to take the natural thyroid however can't afford it. So then she also will alternate with levothyroxine 200 µg. Patient is asking to have a prescription for both of them so she can go back and forth between the 2 of them. Discussed with patient that I will not be prescribing her Levothyroxine and [place] Thyroid and have her alternate as that is not appropriate management.     3. Thalassemia, unspecified type  Patient states she has thalassemia. Patient states that she keeps a handle on her vitamins and minerals that that can be in control. Patient is very focused on having her manganese checked again all though it was just checked in April 27 and it was normal range.    4. Essential hypertension  Patient states that she does have high blood pressure and she was taking lisinopril for this however patient states that she seems to be able to manage her blood pressure with \"vitamins and minerals and diet\" patient states she can take a lot of vitamin D and manganese and her blood pressure seems to come down. Denies any chest pain, shortness of breath or dizziness.    5. Pre-diabetes  Patient states that she does have prediabetes. Most recent A1c back in April of this year was 6.2%. Does not exercise or take any medication for this.      ROS:  Denies any Headache, Blurred Vision, Confusion Chest pain,  Shortness of breath,  Abdominal pain, Changes " of bowel or bladder, Lower ext edema, Fevers, Nights sweats, Weight Changes, Focal weakness or numbness.  All other systems are negative.dental pain      Current Outpatient Prescriptions:   •  Cholecalciferol (VITAMIN D) 2000 units Cap, Take  by mouth., Disp: 30 Cap, Rfl:   •  lisinopril (PRINIVIL) 20 MG Tab, Take 1 Tab by mouth every day., Disp: 30 Tab, Rfl: 3  •  amoxicillin (AMOXIL) 500 MG Cap, Take 1 Cap by mouth 3 times a day., Disp: 30 Cap, Rfl: 0  •  levothyroxine (SYNTHROID) 175 MCG Tab, Take 1 Tab by mouth Every morning on an empty stomach., Disp: 30 Tab, Rfl: 3    No Known Allergies    Past Medical History:   Diagnosis Date   • Abscessed tooth    • Dyslipidemia    • Fibromyalgia    • HTN (hypertension)    • Hypothyroid    • Knee injury     bilateral knee cartilage damage   • Rotator cuff injury     bilateral    • Thalassemia minor      Past Surgical History:   Procedure Laterality Date   • PARATHYROIDECTOMY      2 removed, 2 remaining   • THYROID LOBECTOMY Left    • TONSILLECTOMY       Family History   Problem Relation Age of Onset   • Family history unknown: Yes     Social History     Social History   • Marital status: Single     Spouse name: N/A   • Number of children: N/A   • Years of education: N/A     Occupational History   • Not on file.     Social History Main Topics   • Smoking status: Never Smoker   • Smokeless tobacco: Never Used   • Alcohol use No   • Drug use: No   • Sexual activity: Not on file     Other Topics Concern   • Not on file     Social History Narrative   • No narrative on file       Objective:     Vitals: BP (!) 164/98   Pulse 78   Temp 37 °C (98.6 °F)   Resp 16   Ht 1.524 m (5')   Wt 83.5 kg (184 lb)   SpO2 94%   BMI 35.94 kg/m²      General: Alert, pleasant, NAD  HEENT:  Normocephalic.  Multiple dental cavities, dental decay and broken teeth  Neck supple.  No thyromegaly or masses palpated. No cervical or supraclavicular lymphadenopathy.  Heart:  Regular rate and rhythm.   S1 and S2 normal.  No murmurs appreciated.  Respiratory:  Normal respiratory effort.  Clear to auscultation bilaterally.   Skin:  Warm, dry, no rashes  Musculoskeletal:  Gait is normal.  Moves all extremities well.  Extremities:   No leg edema.  Neurological: No tremors, sensation grossly intact  Psych:  Affect/mood is normal, judgement is poor, memory is intact, grooming is appropriate. Paranoid      Assessment and Plan.   63 y.o. female to establish care and discuss the following    1. Dental abscess  Patient does have multiple dental decay, broken teeth, multiple dental caries. Advised patient to establish with a dentist at some time for dental extraction. We will prescribe her amoxicillin for 10 days.  - amoxicillin (AMOXIL) 500 MG Cap; Take 1 Cap by mouth 3 times a day.  Dispense: 30 Cap; Refill: 0    2. Hypothyroidism, unspecified type  Last TSH not controlled 0.010. Discussed with patient that she's been overcorrected and I will not prescribe her the 200 mcg and she is asking for. I will prescribe her the 175 to levothyroxine and repeat her labs in 6-8 weeks. I also reminded her that is not appropriate to switch back and forth between medications levothyroxine and [place] Thyroid.  - levothyroxine (SYNTHROID) 175 MCG Tab; Take 1 Tab by mouth Every morning on an empty stomach.  Dispense: 30 Tab; Refill: 3  - TSH WITH REFLEX TO FT4; Future    3. Thalassemia, unspecified type  Chronic. Not anemic. Patient is very focused on having her manganese and her vitamin D checked.    4. Essential hypertension  Not controlled. Patient seems to think that she can take multivitamins, vitamin D and manganese to lower blood pressure. Reviewed with patient that her blood pressure is not in control and this can lead to cardiovascular event such as stroke and heart attack and chronic kidney failure. Will refill her lisinopril and advised her to take this daily.  - lisinopril (PRINIVIL) 20 MG Tab; Take 1 Tab by mouth every day.   "Dispense: 30 Tab; Refill: 3    5. Pre-diabetes  Most recent A1c in April was 6.2%. Encouraged diet modification and exercise.      Health Maintenance: Patient refuses to sign the release of records from her previous provider as that she states that \"she would like to see her records before we are able to see them\".    Return in about 3 months (around 8/10/2018).          Rhonda COSTELLO.  "

## 2018-05-12 DIAGNOSIS — E03.9 HYPOTHYROIDISM, UNSPECIFIED TYPE: ICD-10-CM

## 2018-08-27 ENCOUNTER — PATIENT OUTREACH (OUTPATIENT)
Dept: HEALTH INFORMATION MANAGEMENT | Facility: OTHER | Age: 63
End: 2018-08-27

## 2018-08-27 NOTE — PROGRESS NOTES
Outcome: Left Message    Please transfer to Patient Outreach Team at [phone] when patient returns call.    WebIZ Checked & Epic Updated:  yes    [facility] Verified: yes    Attempt # 1

## 2018-09-07 NOTE — PROGRESS NOTES
Outcome: Left Message    Please transfer to Patient Outreach Team at [phone] when patient returns call.    Attempt # 2

## 2018-09-19 DIAGNOSIS — E03.9 HYPOTHYROIDISM, UNSPECIFIED TYPE: ICD-10-CM

## 2018-09-19 RX ORDER — LEVOTHYROXINE SODIUM 175 UG/1
175 TABLET ORAL
Qty: 30 TAB | Refills: 0 | Status: SHIPPED | OUTPATIENT
Start: 2018-09-19 | End: 2021-04-19

## 2018-09-22 NOTE — PROGRESS NOTES
Outcome: Left Message    Please transfer to Patient Outreach Team at [phone] when patient returns call.    Attempt # 4    Health Maintenance Due   Topic Date Due   • Annual Wellness Visit  1955   • IMM DTaP/Tdap/Td Vaccine (1 - Tdap) 03/22/1972   • PAP SMEAR  03/22/1974   • MAMMOGRAM  03/22/1993   • COLONOSCOPY  03/22/2003   • IMM ZOSTER VACCINES (1 of 2) 03/22/2003   • BONE DENSITY  03/22/2018   • IMM PNEUMOCOCCAL 65+ (ADULT) HIGH/HIGHEST RISK SERIES (1 of 2 - PCV13) 03/22/2018   • IMM INFLUENZA (1) 09/01/2018

## 2018-09-27 NOTE — PROGRESS NOTES
Outcome: Left Message    Please transfer to Patient Outreach Team at [phone] when patient returns call.        Attempt # 5

## 2019-01-04 DIAGNOSIS — I10 ESSENTIAL HYPERTENSION: ICD-10-CM

## 2019-01-04 RX ORDER — LISINOPRIL 20 MG/1
TABLET ORAL
Qty: 30 TAB | Refills: 2 | Status: SHIPPED | OUTPATIENT
Start: 2019-01-04 | End: 2019-03-21 | Stop reason: SDUPTHER

## 2019-03-21 DIAGNOSIS — I10 ESSENTIAL HYPERTENSION: ICD-10-CM

## 2019-03-21 RX ORDER — LISINOPRIL 20 MG/1
20 TABLET ORAL
Qty: 90 TAB | Refills: 0 | Status: SHIPPED | OUTPATIENT
Start: 2019-03-21 | End: 2021-04-19 | Stop reason: SDUPTHER

## 2021-01-08 ENCOUNTER — PATIENT OUTREACH (OUTPATIENT)
Dept: HEALTH INFORMATION MANAGEMENT | Facility: OTHER | Age: 66
End: 2021-01-08

## 2021-01-09 NOTE — PROGRESS NOTES
Member warmed transferred from [facility] with CS, fully verified. Member wanting a referral for Specialty. Prior CS rep had informed SCP does not require referrals. When member reached out to specialty, they want a referral for PCP. Member is very upset regarding needing a referral from PCP and only want to see a specialist. I tried to schedule with PCP, member having a hard time deciding exactly what she should do. Member will call back later.

## 2021-02-16 ENCOUNTER — APPOINTMENT (OUTPATIENT)
Dept: LAB | Facility: MEDICAL CENTER | Age: 66
End: 2021-02-16
Payer: MEDICARE

## 2021-03-03 DIAGNOSIS — Z23 NEED FOR VACCINATION: ICD-10-CM

## 2021-04-08 ENCOUNTER — HOSPITAL ENCOUNTER (OUTPATIENT)
Dept: LAB | Facility: MEDICAL CENTER | Age: 66
End: 2021-04-08
Attending: NURSE PRACTITIONER
Payer: MEDICARE

## 2021-04-08 LAB
ALBUMIN SERPL BCP-MCNC: 4.2 G/DL (ref 3.2–4.9)
ALBUMIN/GLOB SERPL: 1.6 G/DL
ALP SERPL-CCNC: 113 U/L (ref 30–99)
ALT SERPL-CCNC: 20 U/L (ref 2–50)
ANION GAP SERPL CALC-SCNC: 11 MMOL/L (ref 7–16)
AST SERPL-CCNC: 19 U/L (ref 12–45)
BASOPHILS # BLD AUTO: 0.9 % (ref 0–1.8)
BASOPHILS # BLD AUTO: 0.9 % (ref 0–1.8)
BASOPHILS # BLD: 0.05 K/UL (ref 0–0.12)
BASOPHILS # BLD: 0.05 K/UL (ref 0–0.12)
BILIRUB SERPL-MCNC: 0.9 MG/DL (ref 0.1–1.5)
BUN SERPL-MCNC: 13 MG/DL (ref 8–22)
CALCIUM SERPL-MCNC: 9.8 MG/DL (ref 8.5–10.5)
CHLORIDE SERPL-SCNC: 108 MMOL/L (ref 96–112)
CHOLEST SERPL-MCNC: 205 MG/DL (ref 100–199)
CO2 SERPL-SCNC: 25 MMOL/L (ref 20–33)
CREAT SERPL-MCNC: 0.66 MG/DL (ref 0.5–1.4)
EOSINOPHIL # BLD AUTO: 0.13 K/UL (ref 0–0.51)
EOSINOPHIL # BLD AUTO: 0.13 K/UL (ref 0–0.51)
EOSINOPHIL NFR BLD: 2.4 % (ref 0–6.9)
EOSINOPHIL NFR BLD: 2.4 % (ref 0–6.9)
ERYTHROCYTE [DISTWIDTH] IN BLOOD BY AUTOMATED COUNT: 37.7 FL (ref 35.9–50)
EST. AVERAGE GLUCOSE BLD GHB EST-MCNC: 137 MG/DL
FASTING STATUS PATIENT QL REPORTED: NORMAL
FERRITIN SERPL-MCNC: 256 NG/ML (ref 10–291)
GLOBULIN SER CALC-MCNC: 2.7 G/DL (ref 1.9–3.5)
GLUCOSE SERPL-MCNC: 108 MG/DL (ref 65–99)
HBA1C MFR BLD: 6.4 % (ref 4–5.6)
HCT VFR BLD AUTO: 40.6 % (ref 37–47)
HDLC SERPL-MCNC: 44 MG/DL
HGB BLD-MCNC: 12.5 G/DL (ref 12–16)
IMM GRANULOCYTES # BLD AUTO: 0.05 K/UL (ref 0–0.11)
IMM GRANULOCYTES NFR BLD AUTO: 0.9 % (ref 0–0.9)
IRON SATN MFR SERPL: 24 % (ref 15–55)
IRON SERPL-MCNC: 68 UG/DL (ref 40–170)
LDLC SERPL CALC-MCNC: 135 MG/DL
LYMPHOCYTES # BLD AUTO: 1.18 K/UL (ref 1–4.8)
LYMPHOCYTES # BLD AUTO: 1.18 K/UL (ref 1–4.8)
LYMPHOCYTES NFR BLD: 21.6 % (ref 22–41)
LYMPHOCYTES NFR BLD: 21.6 % (ref 22–41)
MANUAL DIFF BLD: ABNORMAL
MCH RBC QN AUTO: 20.7 PG (ref 27–33)
MCHC RBC AUTO-ENTMCNC: 30.8 G/DL (ref 33.6–35)
MCV RBC AUTO: 67.1 FL (ref 81.4–97.8)
MONOCYTES # BLD AUTO: 0.52 K/UL (ref 0–0.85)
MONOCYTES # BLD AUTO: 0.52 K/UL (ref 0–0.85)
MONOCYTES NFR BLD AUTO: 9.5 % (ref 0–13.4)
MONOCYTES NFR BLD AUTO: 9.5 % (ref 0–13.4)
NEUTROPHILS # BLD AUTO: 3.54 K/UL (ref 2–7.15)
NEUTROPHILS # BLD AUTO: 3.54 K/UL (ref 2–7.15)
NEUTROPHILS NFR BLD: 64.7 % (ref 44–72)
NEUTROPHILS NFR BLD: 64.7 % (ref 44–72)
NRBC # BLD AUTO: 0 K/UL
NRBC BLD-RTO: 0 /100 WBC
NRBC BLD-RTO: 0 /100 WBC
PLATELET # BLD AUTO: 205 K/UL (ref 164–446)
PMV BLD AUTO: 10.2 FL (ref 9–12.9)
POTASSIUM SERPL-SCNC: 3.9 MMOL/L (ref 3.6–5.5)
PROT SERPL-MCNC: 6.9 G/DL (ref 6–8.2)
PTH-INTACT SERPL-MCNC: 75.5 PG/ML (ref 14–72)
RBC # BLD AUTO: 6.05 M/UL (ref 4.2–5.4)
SODIUM SERPL-SCNC: 144 MMOL/L (ref 135–145)
T4 FREE SERPL-MCNC: 2.02 NG/DL (ref 0.93–1.7)
TIBC SERPL-MCNC: 285 UG/DL (ref 250–450)
TRIGL SERPL-MCNC: 129 MG/DL (ref 0–149)
TSH SERPL DL<=0.005 MIU/L-ACNC: 0.22 UIU/ML (ref 0.38–5.33)
UIBC SERPL-MCNC: 217 UG/DL (ref 110–370)
WBC # BLD AUTO: 5.5 K/UL (ref 4.8–10.8)

## 2021-04-08 PROCEDURE — 84439 ASSAY OF FREE THYROXINE: CPT

## 2021-04-08 PROCEDURE — 81256 HFE GENE: CPT

## 2021-04-08 PROCEDURE — 85025 COMPLETE CBC W/AUTO DIFF WBC: CPT

## 2021-04-08 PROCEDURE — 80053 COMPREHEN METABOLIC PANEL: CPT

## 2021-04-08 PROCEDURE — 36415 COLL VENOUS BLD VENIPUNCTURE: CPT

## 2021-04-08 PROCEDURE — 80061 LIPID PANEL: CPT

## 2021-04-08 PROCEDURE — 83540 ASSAY OF IRON: CPT

## 2021-04-08 PROCEDURE — 83036 HEMOGLOBIN GLYCOSYLATED A1C: CPT

## 2021-04-08 PROCEDURE — 83970 ASSAY OF PARATHORMONE: CPT

## 2021-04-08 PROCEDURE — 80500 HCHG CLINICAL PATH CONSULT-LIMITED: CPT

## 2021-04-08 PROCEDURE — 84443 ASSAY THYROID STIM HORMONE: CPT

## 2021-04-08 PROCEDURE — 82306 VITAMIN D 25 HYDROXY: CPT

## 2021-04-08 PROCEDURE — 83550 IRON BINDING TEST: CPT

## 2021-04-08 PROCEDURE — 82728 ASSAY OF FERRITIN: CPT

## 2021-04-09 LAB
PATH REV: NORMAL
PATH REV: NORMAL

## 2021-04-10 LAB — 25(OH)D3 SERPL-MCNC: 30 NG/ML (ref 30–80)

## 2021-04-12 ENCOUNTER — TELEPHONE (OUTPATIENT)
Dept: NEUROLOGY | Facility: MEDICAL CENTER | Age: 66
End: 2021-04-12

## 2021-04-14 LAB
HFE GENE MUT ANL BLD/T: NORMAL
HFE P.C282Y BLD/T QL: NEGATIVE
HFE P.H63D BLD/T QL: NEGATIVE
HFE P.S65C BLD/T QL: NEGATIVE

## 2021-04-19 ENCOUNTER — OFFICE VISIT (OUTPATIENT)
Dept: MEDICAL GROUP | Facility: MEDICAL CENTER | Age: 66
End: 2021-04-19
Payer: MEDICARE

## 2021-04-19 VITALS
OXYGEN SATURATION: 97 % | SYSTOLIC BLOOD PRESSURE: 142 MMHG | HEART RATE: 86 BPM | WEIGHT: 207 LBS | RESPIRATION RATE: 16 BRPM | TEMPERATURE: 98.7 F | BODY MASS INDEX: 40.64 KG/M2 | DIASTOLIC BLOOD PRESSURE: 82 MMHG | HEIGHT: 60 IN

## 2021-04-19 DIAGNOSIS — R73.09 ELEVATED GLYCOHEMOGLOBIN: ICD-10-CM

## 2021-04-19 DIAGNOSIS — H53.8 BLURRED VISION, BILATERAL: ICD-10-CM

## 2021-04-19 DIAGNOSIS — E03.9 HYPOTHYROIDISM, UNSPECIFIED TYPE: ICD-10-CM

## 2021-04-19 DIAGNOSIS — E78.5 DYSLIPIDEMIA: ICD-10-CM

## 2021-04-19 DIAGNOSIS — E53.1 VITAMIN B6 DEFICIENCY: ICD-10-CM

## 2021-04-19 DIAGNOSIS — Z12.31 ENCOUNTER FOR SCREENING MAMMOGRAM FOR BREAST CANCER: ICD-10-CM

## 2021-04-19 DIAGNOSIS — R73.09 ELEVATED GLUCOSE: ICD-10-CM

## 2021-04-19 DIAGNOSIS — M17.0 PRIMARY OSTEOARTHRITIS OF KNEES, BILATERAL: ICD-10-CM

## 2021-04-19 DIAGNOSIS — R73.03 PRE-DIABETES: ICD-10-CM

## 2021-04-19 DIAGNOSIS — Z00.00 MEDICARE ANNUAL WELLNESS VISIT, SUBSEQUENT: ICD-10-CM

## 2021-04-19 DIAGNOSIS — D56.9 THALASSEMIA, UNSPECIFIED TYPE: ICD-10-CM

## 2021-04-19 DIAGNOSIS — I10 ESSENTIAL HYPERTENSION: ICD-10-CM

## 2021-04-19 PROCEDURE — G0439 PPPS, SUBSEQ VISIT: HCPCS | Performed by: FAMILY MEDICINE

## 2021-04-19 RX ORDER — HYDROCHLOROTHIAZIDE 12.5 MG/1
TABLET ORAL
COMMUNITY
Start: 2021-01-21 | End: 2021-04-19

## 2021-04-19 RX ORDER — LEVOTHYROXINE SODIUM 0.15 MG/1
150 TABLET ORAL
Qty: 90 TABLET | Refills: 3 | Status: SHIPPED | OUTPATIENT
Start: 2021-04-19

## 2021-04-19 RX ORDER — LEVOTHYROXINE SODIUM 0.15 MG/1
150 TABLET ORAL
Qty: 90 TABLET | Refills: 3 | Status: SHIPPED | OUTPATIENT
Start: 2021-04-19 | End: 2021-04-19 | Stop reason: SDUPTHER

## 2021-04-19 RX ORDER — LISINOPRIL 20 MG/1
20 TABLET ORAL
Qty: 90 TABLET | Refills: 3 | Status: SHIPPED | OUTPATIENT
Start: 2021-04-19

## 2021-04-19 ASSESSMENT — ACTIVITIES OF DAILY LIVING (ADL): BATHING_REQUIRES_ASSISTANCE: 0

## 2021-04-19 ASSESSMENT — PATIENT HEALTH QUESTIONNAIRE - PHQ9
CLINICAL INTERPRETATION OF PHQ2 SCORE: 2
5. POOR APPETITE OR OVEREATING: 1 - SEVERAL DAYS
SUM OF ALL RESPONSES TO PHQ QUESTIONS 1-9: 5

## 2021-04-19 ASSESSMENT — FIBROSIS 4 INDEX: FIB4 SCORE: 1.41

## 2021-04-19 ASSESSMENT — ENCOUNTER SYMPTOMS: GENERAL WELL-BEING: FAIR

## 2021-04-19 NOTE — PROGRESS NOTES
Chief Complaint   Patient presents with   • Establish Care   • Annual Wellness Visit       HPI:  Georgia is a 66 y.o. here for Medicare Annual Wellness Visit    She is here to establish care and complete her annual wellness    Patient Active Problem List    Diagnosis Date Noted   • Vitamin B6 deficiency 04/19/2021   • Blurred vision, bilateral 04/19/2021   • Elevated glucose 04/19/2021   • Elevated glycohemoglobin 04/19/2021   • Primary osteoarthritis of knees, bilateral 04/19/2021   • Pre-diabetes 05/10/2018   • Thalassemia    • Hypothyroid    • HTN (hypertension)    • Fibromyalgia    • Dyslipidemia        Current Outpatient Medications   Medication Sig Dispense Refill   • levothyroxine (SYNTHROID) 150 MCG Tab Take 1 tablet by mouth every morning on an empty stomach. 90 tablet 3   • lisinopril (PRINIVIL) 20 MG Tab Take 1 tablet by mouth every day. 90 tablet 3   • Cholecalciferol (VITAMIN D) 2000 units Cap Take  by mouth. 30 Cap      No current facility-administered medications for this visit.        Patient is taking medications as noted in medication list.  Current supplements as per medication list.     Allergies: Patient has no known allergies.    Current social contact/activities: she is limited as far as diet, has nowhere to cook.    Is patient current with immunizations? No, due for PNEUMOVAX (PPSV23) and she declines all vaccines. Patient is interested in receiving NONE today.    She  reports that she has never smoked. She has never used smokeless tobacco. She reports that she does not drink alcohol and does not use drugs.  Counseling given: Yes      DPA/Advanced directive: Patient has Living Will, but it is not on file. Instructed to bring in a copy to scan into their chart.    ROS:    Gait: Uses Walker    Ostomy: No   Other tubes: No   Amputations: No   Chronic oxygen use No   Last eye exam 2016  Wears hearing aids: No   : Reports urinary leakage during the last 6 months that has somewhat interfered with  their daily activities or sleep.    Screening:  Discussed, prefers not to complete    Depression Screening  Little interest or pleasure in doing things?  1 - several days  Feeling down, depressed, or hopeless? 1 - several days  Trouble falling or staying asleep, or sleeping too much?  1 - several days  Feeling tired or having little energy?  1 - several days  Poor appetite or overeating?  1 - several days  Feeling bad about yourself - or that you are a failure or have let yourself or your family down? 0 - not at all  Trouble concentrating on things, such as reading the newspaper or watching television? 0 - not at all  Moving or speaking so slowly that other people could have noticed.  Or the opposite - being so fidgety or restless that you have been moving around a lot more than usual?  0 - not at all  Thoughts that you would be better off dead, or of hurting yourself?  0 - not at all  Patient Health Questionnaire Score: 5    If depressive symptoms identified deferred to follow up visit unless specifically addressed in assessment and plan.    Interpretation of PHQ-9 Total Score   Score Severity   1-4 No Depression   5-9 Mild Depression   10-14 Moderate Depression   15-19 Moderately Severe Depression   20-27 Severe Depression      Screening for Cognitive Impairment  Three Minute Recall (captain, juanito, picture)  3/3    Draw clock face with all 12 numbers and set the hands to show 5 past 8.  Yes 5/5 8:05  If cognitive concerns identified, deferred for follow up unless specifically addressed in assessment and plan.    Fall Risk Assessment  Has the patient had two or more falls in the last year or any fall with injury in the last year?  No  If fall risk identified, deferred for follow up unless specifically addressed in assessment and plan.    Safety Assessment  Throw rugs on floor.  No  Handrails on all stairs.  No  Good lighting in all hallways.  Yes  Difficulty hearing.  No  Patient counseled about all safety risks  that were identified.    Functional Assessment ADLs  Are there any barriers preventing you from cooking for yourself or meeting nutritional needs?  No.    Are there any barriers preventing you from driving safely or obtaining transportation?  Yes.    Are there any barriers preventing you from using a telephone or calling for help?  No.    Are there any barriers preventing you from shopping?  No.    Are there any barriers preventing you from taking care of your own finances?  No.    Are there any barriers preventing you from managing your medications?    No.    Are there any barriers preventing you from showering, bathing or dressing yourself?  No.    Are you currently engaging in any exercise or physical activity?  Yes.     What is your perception of your health?  Fair.    Health Maintenance Summary                COVID-19 Vaccine Overdue 3/22/1969     COLONOSCOPY Overdue 3/22/2003     IMM ZOSTER VACCINES Postponed 9/19/2021 Originally 3/22/2003. Patient Refused    MAMMOGRAM Postponed 4/19/2022 Originally 3/22/1993. Patient Refused    BONE DENSITY Postponed 4/19/2022 Originally 3/22/2018. Patient Refused    IMM DTaP/Tdap/Td Vaccine Postponed 4/19/2022 Originally 3/22/1972. Insurance/Financial    IMM PNEUMOCOCCAL VACCINE: 65+ Years Postponed 4/19/2022 Originally 3/22/2018. Patient Refused    IMM INFLUENZA Next Due 9/1/2021           Patient Care Team:  Shahzad Yanes M.D. as PCP - General (Family Medicine)    Social History     Tobacco Use   • Smoking status: Never Smoker   • Smokeless tobacco: Never Used   Substance Use Topics   • Alcohol use: No   • Drug use: No     Family History   Family history unknown: Yes     She  has a past medical history of Abscessed tooth, Dyslipidemia, Fibromyalgia, HTN (hypertension), Hypothyroid, Knee injury, Rotator cuff injury, and Thalassemia minor.   Past Surgical History:   Procedure Laterality Date   • PARATHYROIDECTOMY      2 removed, 2 remaining   • THYROID LOBECTOMY Left    •  TONSILLECTOMY           Exam:   /82 (BP Location: Left arm)   Pulse 86   Temp 37.1 °C (98.7 °F)   Resp 16   Ht 1.524 m (5')   Wt 93.9 kg (207 lb)   SpO2 97%  Body mass index is 40.43 kg/m².    Hearing good.    Dentition not examined.  Patient, physician and staff all wearing masks.  She is using a four wheeled walker.  Alert, oriented in no acute distress.  Eye contact is good, speech goal directed, affect calm  HEENT:   EOMI, PERRLA.    Neck:       Full range of motion. No JVD or carotid bruits appreciated. No cervical adenopathy appreciated. No thyromegaly or neck masses appreciated.  No retractions appreciated.  Lungs:     Clear to auscultation A&P with good air movement.   Heart:      Regular rate and rhythm normal S1 and S2 without murmur appreciated.  Strong and symmetric radial and DP pulses.  Abd:        Soft, bowel sounds positive, nontender. No bloating noted. No hepatosplenomegaly or mass appreciated. No pulsatile mass appreciated.  Ext:         Extremities show symmetric and full range of motion with normal strength. No cyanosis or clubbing appreciated. There is swelling but no pitting edema.   Neuro:    Gait is normal. Patient is lucid, fluent and appropriate. No significant tremor appreciated.  Skin:       Exhibit no rashes, pigmented lesions or ulcerations.      Assessment and Plan. The following treatment and monitoring plan is recommended:       1. Medicare annual wellness visit, subsequent  Her medical problems are generally stable but persistent.  Patient is here to establish care with me.  She prefers to manage her problems with supplements, particularly mineral supplements.    2. Encounter for screening mammogram for breast cancer  Mammogram order discussed and placed  - MA-SCREENING MAMMO BILAT W/TOMOSYNTHESIS W/CAD; Future    3. Hypothyroidism, unspecified type  Patient reports good energy level on the medication. Patient denies insomnia, tremor or change in appetite.  Patient is  taking the medication on an empty stomach in the morning and waiting at least 30 minutes before eating.  Last TSH just a few days ago on April 8 was significantly suppressed.  I have adjusted the patient's medication.  Stable problem.    - levothyroxine (SYNTHROID) 150 MCG Tab; Take 1 tablet by mouth every morning on an empty stomach.  Dispense: 90 tablet; Refill: 3    4. Vitamin B6 deficiency  Patient was told in the past she had a vitamin B6 deficiency.  It is unclear to me if she is currently taking vitamin B6.  Follow-up lab order discussed and placed.  Patient does complain of diffuse pain but also moves her extremities symmetrically and well.  No significant tremor or weakness appreciated today.  Stable problem.  - VITAMIN B6; Future    5. Pre-diabetes/Elevated glucose/Elevated glycohemoglobin  Patient does have an elevated glycohemoglobin.  This and an elevated fasting sugar confirm that the patient has prediabetes.  She would like to work on this.  She will be changing her diet now that she will be moving in a place where she can actually cook.  Referrals and lab orders are discussed.  Plan lab recheck in 3 to 4 months.  Clinically stable problem without excessive thirst or urination.  Stable overall.  - HEMOGLOBIN A1C; Future  - Comp Metabolic Panel; Future  - REFERRAL TO OPHTHALMOLOGY    6. Thalassemia, unspecified type  Patient does appear to have thalassemia minor.  This is discussed.  Patient really wants this salt and cured.  Have discussed with the patient that this is a genetic issue and I am not aware of any kind of cure.  She would like to discuss this with hematology.  Referral is discussed and placed.  Patient has not had severe anemia or needed transfusions.  Patient feels a lot of her symptoms are due to iron overload.  Her iron overload tests have been negative.  Stable problem.  - REFERRAL TO HEMATOLOGY ONCOLOGY    7. Dyslipidemia  Patient denies chest pain, chest pressure, palpitations or  exertional shortness of breath. Patient is not on lipid-lowering medication.  Recent lab testing earlier this month shows mild lipid elevation with an LDL of 135. Patient is a never smoker. Patient takes no aspirin daily. Patient has no history of myocardial infarction, stroke or PVD.  The problem is clinically stable.    8. Essential hypertension  HTN - Chronic condition stable.  Patient has been taking the medications erratically.  Discussed that I recommend they be taken daily.  She is not taking baby aspirin daily.   She is not monitoring BP at home.   Denies symptoms low BP: light-headed, tunnel-vision, unusual fatigue.   Denies symptoms high BP:pounding headache, visual changes, palpitations, flushed face.   Denies medicine side effects: unusual fatigue, slow heartbeat, foot/leg swelling, cough.  - lisinopril (PRINIVIL) 20 MG Tab; Take 1 tablet by mouth every day.  Dispense: 90 tablet; Refill: 3  - Comp Metabolic Panel; Future    9. Blurred vision, bilateral  Patient states both near and far vision are now blurred.  Her glasses appear to be somewhat scratched.  Patient has been noticing more blurring in the last year and this may be due to the pre-diabetes.  Ophthalmology referral discussed and placed.  - REFERRAL TO OPHTHALMOLOGY    10. Primary osteoarthritis of knees, bilateral  Patient does have bilateral knee arthritis.  She is using a four-wheel walker with seat.  I believe the right knee is more bothersome to her.  Patient is using topical rubs.  History of orthopedic evaluation in the past.  Currently stable problem.      Services suggested: No services needed at this time  Health Care Screening recommendations as per orders if indicated.  Referrals offered: PT/OT/Nutrition counseling/Behavioral Health/Smoking cessation as per orders if indicated.    Discussion today about general wellness and lifestyle habits: discussed  · Prevent falls and reduce trip hazards; Cautioned about securing or removing  rugs.  · Have a working fire alarm and carbon monoxide detector;  has  · Engage in regular physical activity and social activities     Follow-up: Return in about 6 months (around 10/19/2021), or if symptoms worsen or fail to improve.

## 2021-05-06 ENCOUNTER — DOCUMENTATION (OUTPATIENT)
Dept: MEDICAL GROUP | Facility: MEDICAL CENTER | Age: 66
End: 2021-05-06

## 2021-05-06 ENCOUNTER — OFFICE VISIT (OUTPATIENT)
Dept: MEDICAL GROUP | Facility: MEDICAL CENTER | Age: 66
End: 2021-05-06
Payer: MEDICARE

## 2021-05-06 VITALS
OXYGEN SATURATION: 96 % | WEIGHT: 200 LBS | BODY MASS INDEX: 39.27 KG/M2 | TEMPERATURE: 98.4 F | HEART RATE: 85 BPM | SYSTOLIC BLOOD PRESSURE: 138 MMHG | DIASTOLIC BLOOD PRESSURE: 92 MMHG | HEIGHT: 60 IN | RESPIRATION RATE: 16 BRPM

## 2021-05-06 DIAGNOSIS — E53.1 VITAMIN B6 DEFICIENCY: ICD-10-CM

## 2021-05-06 DIAGNOSIS — R73.03 PREDIABETES: ICD-10-CM

## 2021-05-06 DIAGNOSIS — R78.79 HIGH BLOOD COPPER LEVEL: ICD-10-CM

## 2021-05-06 DIAGNOSIS — M62.81 MUSCLE WEAKNESS: ICD-10-CM

## 2021-05-06 DIAGNOSIS — E21.2 OTHER HYPERPARATHYROIDISM (HCC): ICD-10-CM

## 2021-05-06 DIAGNOSIS — E60 ZINC DEFICIENCY: ICD-10-CM

## 2021-05-06 DIAGNOSIS — E61.2 MAGNESIUM DEFICIENCY: ICD-10-CM

## 2021-05-06 PROCEDURE — 99213 OFFICE O/P EST LOW 20 MIN: CPT | Performed by: FAMILY MEDICINE

## 2021-05-06 RX ORDER — LEVOTHYROXINE SODIUM 175 UG/1
TABLET ORAL
COMMUNITY
Start: 2021-04-29 | End: 2021-05-06

## 2021-05-06 ASSESSMENT — FIBROSIS 4 INDEX: FIB4 SCORE: 1.41

## 2021-05-06 NOTE — PROGRESS NOTES
"This patient has a scheduled appt with Dr. Yanes this morning at 9:00. She then cancelled it, made another appt in our 3:00 \"same day\" spot. Then she still showed at at 9:20 in hopes of still being seen. Rosalia, our PAR came and told me that the patient just showed up and was asking to be seen. Otherwise, she'd still come in at 3 P.M. We both mentioned this to Dr. Yanes and because the 3:00 spot is technically for a \"same day appt\"\" (not to be used when a patient has missed their appt time), Dr. Yanes was frustrated, but agreed to still see her for that 9 A.M. spot. In order to save the 3:00 spot.    The patient refused me taking her BP due to the fact that she stated she rushed in here and didn't want the BP to be \"high\" and then she'd require medication. She then was trying to explain to me why she was late, and mentioned waiting for a handicap spot. She also mentioned something about arguing with someone.    I let Dr. Yanes know then she went into the room with the patient and had their visit.  "

## 2021-05-06 NOTE — PROGRESS NOTES
Chief Complaint   Patient presents with   • Vitamin B12 Deficiency   • Prediabetes   • Other     vitamin and mineral deficiency       Subjective:     HPI:   Liliana Cifuentes presents today with the following: Patient is following up on orders for blood testing.    She has family history of progressive weakness that has made a lot of her family unable to work and has contributed to early deaths.  She has been researching this and also has looked at her past records.    1. Zinc deficiency  Patient has history of zinc deficiency, the documentation is around 30 years old but does need follow-up.    2. Vitamin B6 deficiency  In the past patient had severe vitamin B6 deficiency.  Needs follow-up testing.  Patient does have gait unsteadiness and fatigue which might be related.    3. Magnesium deficiency  Patient has documented magnesium deficiency as well.  She is taking some magnesium supplementation.  Follow-up lab order discussed and placed.    4. Muscle weakness  Patient has muscle weakness which may be multifactorial.  There may indeed be a genetic component.  She is thinking this may be from a mutation.   MTHFR order discussed and placed.    5. High blood copper level  Patient had high blood copper level in the past and would like a recheck.  She has been having to live in old buildings that may quite possibly have copper contamination.  A recent NIH study showed high copper levels in as much as 80% of [place] drinking water.    6. Prediabetes  Patient does have history of prediabetes, follow-up orders discussed and placed.    7. Other hyperparathyroidism (HCC)   Patient does have hyperparathyroidism.  She does not appear to have chronic kidney disease.  I think this is partly because of other abnormalities, possibly even her overtreated thyroid.        Patient Active Problem List    Diagnosis Date Noted   • Vitamin B6 deficiency 04/19/2021   • Blurred vision, bilateral 04/19/2021   • Elevated glucose 04/19/2021   •  Elevated glycohemoglobin 04/19/2021   • Primary osteoarthritis of knees, bilateral 04/19/2021   • Pre-diabetes 05/10/2018   • Thalassemia    • Hypothyroid    • HTN (hypertension)    • Fibromyalgia    • Dyslipidemia        Current medicines (including changes today)  Current Outpatient Medications   Medication Sig Dispense Refill   • levothyroxine (SYNTHROID) 150 MCG Tab Take 1 tablet by mouth every morning on an empty stomach. 90 tablet 3   • lisinopril (PRINIVIL) 20 MG Tab Take 1 tablet by mouth every day. 90 tablet 3   • Cholecalciferol (VITAMIN D) 2000 units Cap Take  by mouth. 30 Cap      No current facility-administered medications for this visit.       Allergies   Allergen Reactions   • Amlodipine Swelling       ROS: As per HPI       Objective:     /92   Pulse 85   Temp 36.9 °C (98.4 °F)   Resp 16   Ht 1.524 m (5')   Wt 90.7 kg (200 lb)   SpO2 96%  Body mass index is 39.06 kg/m².    Physical Exam:  Constitutional: Well-developed and well-nourished. Not diaphoretic. No distress. Lucid and fluent.  Patient, physician, staff all wearing masks.  Using her 4 wheeled walker.  Skin: Skin is warm and dry. No rash noted.  Head: Atraumatic without lesions.  Eyes: Conjunctivae and extraocular motions are normal. Pupils are equal, round, and reactive to light. No scleral icterus.   Ears:  External ears unremarkable.   Neck: Supple, trachea midline. No thyromegaly present. No cervical or supraclavicular lymphadenopathy. No JVD appreciated  Cardiovascular: Regular rate and rhythm.  Normal S1, S2 without murmur appreciated.  Extremities: No cyanosis, clubbing, erythema, nor edema.   Neurological: Alert and oriented x 3.  Psychiatric:  Behavior, mood, and affect are appropriate.       Assessment and Plan:     66 y.o. female with the following issues:    1. Zinc deficiency  ZINC SERUM   2. Vitamin B6 deficiency     3. Magnesium deficiency  MAGNESIUM   4. Muscle weakness  MTHFR DNA ANALYSIS   5. High blood copper  level  COPPER, SERUM   6. Prediabetes  MAGNESIUM   7. Other hyperparathyroidism (HCC)   MAGNESIUM         Followup: Return in about 4 months (around 9/6/2021), or if symptoms worsen or fail to improve.

## 2021-05-13 ENCOUNTER — HOSPITAL ENCOUNTER (OUTPATIENT)
Dept: LAB | Facility: MEDICAL CENTER | Age: 66
End: 2021-05-13
Attending: FAMILY MEDICINE
Payer: MEDICARE

## 2021-05-13 DIAGNOSIS — R73.09 ELEVATED GLYCOHEMOGLOBIN: ICD-10-CM

## 2021-05-13 DIAGNOSIS — E61.2 MAGNESIUM DEFICIENCY: ICD-10-CM

## 2021-05-13 DIAGNOSIS — E21.2 OTHER HYPERPARATHYROIDISM (HCC): ICD-10-CM

## 2021-05-13 DIAGNOSIS — R73.09 ELEVATED GLUCOSE: ICD-10-CM

## 2021-05-13 DIAGNOSIS — E53.1 VITAMIN B6 DEFICIENCY: ICD-10-CM

## 2021-05-13 DIAGNOSIS — I10 ESSENTIAL HYPERTENSION: ICD-10-CM

## 2021-05-13 DIAGNOSIS — E60 ZINC DEFICIENCY: ICD-10-CM

## 2021-05-13 DIAGNOSIS — R73.03 PREDIABETES: ICD-10-CM

## 2021-05-13 DIAGNOSIS — R73.03 PRE-DIABETES: ICD-10-CM

## 2021-05-13 DIAGNOSIS — M62.81 MUSCLE WEAKNESS: ICD-10-CM

## 2021-05-13 DIAGNOSIS — R78.79 HIGH BLOOD COPPER LEVEL: ICD-10-CM

## 2021-05-13 LAB — MAGNESIUM SERPL-MCNC: 2 MG/DL (ref 1.5–2.5)

## 2021-05-13 PROCEDURE — 81291 MTHFR GENE: CPT

## 2021-05-13 PROCEDURE — 82525 ASSAY OF COPPER: CPT

## 2021-05-13 PROCEDURE — 84630 ASSAY OF ZINC: CPT

## 2021-05-13 PROCEDURE — 84207 ASSAY OF VITAMIN B-6: CPT

## 2021-05-13 PROCEDURE — 83735 ASSAY OF MAGNESIUM: CPT

## 2021-05-13 PROCEDURE — 36415 COLL VENOUS BLD VENIPUNCTURE: CPT

## 2021-05-16 LAB
COPPER SERPL-MCNC: 173.5 UG/DL (ref 80–155)
ZINC SERPL-MCNC: 101.5 UG/DL (ref 60–120)

## 2021-05-17 LAB
MTHFR C.1298A>C GENO BLD/T: ABNORMAL
MTHFR C.677C>T GENO BLD/T: ABNORMAL
MTHFR GENE MUT ANL BLD/T: ABNORMAL
VIT B6 SERPL-MCNC: 179.6 NMOL/L (ref 20–125)

## 2021-05-24 ENCOUNTER — OFFICE VISIT (OUTPATIENT)
Dept: MEDICAL GROUP | Facility: MEDICAL CENTER | Age: 66
End: 2021-05-24
Payer: MEDICARE

## 2021-05-24 VITALS
HEART RATE: 77 BPM | TEMPERATURE: 98.7 F | BODY MASS INDEX: 39.27 KG/M2 | RESPIRATION RATE: 16 BRPM | HEIGHT: 60 IN | SYSTOLIC BLOOD PRESSURE: 142 MMHG | WEIGHT: 200 LBS | OXYGEN SATURATION: 96 % | DIASTOLIC BLOOD PRESSURE: 84 MMHG

## 2021-05-24 DIAGNOSIS — E51.9 VITAMIN B1 DEFICIENCY: ICD-10-CM

## 2021-05-24 DIAGNOSIS — E53.8 VITAMIN B12 DEFICIENCY: ICD-10-CM

## 2021-05-24 PROCEDURE — 99213 OFFICE O/P EST LOW 20 MIN: CPT | Performed by: FAMILY MEDICINE

## 2021-05-24 ASSESSMENT — FIBROSIS 4 INDEX: FIB4 SCORE: 1.41

## 2021-05-24 NOTE — PROGRESS NOTES
Chief Complaint   Patient presents with   • Results   • Muscle Pain       Subjective:     HPI:   Liliana Cifuentes presents today with the followin. Vitamin B12 deficiency  Follow-up order discussed and placed.  She has changed products and wants to see if this is absorbed.    2. Vitamin B1 deficiency  Follow-up orders discussed and placed.    Discussed her MTHFR results today.  She is heterozygous for what is felt to be a less problematic allele.      Patient Active Problem List    Diagnosis Date Noted   • Vitamin B6 deficiency 2021   • Blurred vision, bilateral 2021   • Elevated glucose 2021   • Elevated glycohemoglobin 2021   • Primary osteoarthritis of knees, bilateral 2021   • Pre-diabetes 05/10/2018   • Thalassemia    • Hypothyroid    • HTN (hypertension)    • Fibromyalgia    • Dyslipidemia        Current medicines (including changes today)  Current Outpatient Medications   Medication Sig Dispense Refill   • levothyroxine (SYNTHROID) 150 MCG Tab Take 1 tablet by mouth every morning on an empty stomach. 90 tablet 3   • lisinopril (PRINIVIL) 20 MG Tab Take 1 tablet by mouth every day. 90 tablet 3   • Cholecalciferol (VITAMIN D) 2000 units Cap Take  by mouth. 30 Cap      No current facility-administered medications for this visit.       Allergies   Allergen Reactions   • Amlodipine Swelling       ROS: As per HPI       Objective:     /84 (BP Location: Left arm)   Pulse 77   Temp 37.1 °C (98.7 °F)   Resp 16   Ht 1.524 m (5')   Wt 90.7 kg (200 lb)   SpO2 96%  Body mass index is 39.06 kg/m².    Physical Exam:  Constitutional: Well-developed and well-nourished. Not diaphoretic. No distress. Lucid and fluent.  Patient, physician and staff all wearing masks.    Skin: Skin is warm and dry. No rash noted.  Head: Atraumatic without lesions.  Eyes: Conjunctivae and extraocular motions are normal. Pupils are equal, round, and reactive to light. No scleral icterus.   Ears:   External ears unremarkable.   Neck: Supple, trachea midline. No thyromegaly present. No cervical or supraclavicular lymphadenopathy. No JVD appreciated  Extremities: No cyanosis, clubbing, erythema, nor edema.   Neurological: Alert and oriented x 3. No tremor appreciated  Psychiatric:  Behavior, mood, and affect are appropriate.       Assessment and Plan:     66 y.o. female with the following issues:    1. Vitamin B12 deficiency  VITAMIN B12    FOLATE   2. Vitamin B1 deficiency  VITAMIN B1         Followup: Return if symptoms worsen or fail to improve.

## 2021-05-26 ENCOUNTER — PATIENT OUTREACH (OUTPATIENT)
Dept: HEALTH INFORMATION MANAGEMENT | Facility: OTHER | Age: 66
End: 2021-05-26
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
## 2021-08-13 ENCOUNTER — APPOINTMENT (OUTPATIENT)
Dept: MEDICAL GROUP | Facility: MEDICAL CENTER | Age: 66
End: 2021-08-13
Payer: MEDICARE

## 2021-08-19 ENCOUNTER — TELEPHONE (OUTPATIENT)
Dept: MEDICAL GROUP | Facility: MEDICAL CENTER | Age: 66
End: 2021-08-19

## 2021-08-19 DIAGNOSIS — E03.9 HYPOTHYROIDISM, UNSPECIFIED TYPE: ICD-10-CM

## 2021-08-19 NOTE — TELEPHONE ENCOUNTER
1. Caller Name: Liliana Cifuentes          Call Back Number: [phone]      How would the patient prefer to be contacted with a response: Phone call OK to leave a detailed message    2. SPECIFIC Action To Be Taken: Durable Medical Equipment / Walker   Orders for labs, to check her thyroid and Vitamin D (pt had a vitamin D test in 4/2021), so she doesn't know if she needs to complete another one, because, she would like to get a prescription of Vitamins D    3. Diagnosis/Clinical Reason for Request: Pt stated that she has a walker that is too big and one of the wheels is not rolling correctly, so, that is afecting her shoulders and arms.    4. Specialty & Provider Name/Lab/Imaging Location: DME/ Labs/ Prescription of Vitamin D.    5. Is appointment scheduled for requested order/referral: no    Patient was informed they will receive a return phone call from the office ONLY if there are any questions before processing their request. Advised to call back if they haven't received a call from the referral department in 5 days.

## 2021-08-19 NOTE — TELEPHONE ENCOUNTER
Her vitamin D was just checked in April.  Medicare typically will only approve this every 6 months so she needs to wait until October.  Her thyroid test was in April and was abnormal.  That can be covered every 90 days so I have placed that order which she can do along with the rest of the labs that I ordered in May.  I need a little clarification as far as the walker.  I believe she is needing the walker because of her bilateral knee arthritis.  Is that correct?

## 2021-08-19 NOTE — TELEPHONE ENCOUNTER
Good afternoon Dr. Yanes,    Pt didn't mentioned anything about her knee, she said that her walker it's not working properly, so, she using more strength with her arms and that is causing pain on shoulders and arms. Please advice.    Thank you.

## 2021-09-10 ENCOUNTER — TELEPHONE (OUTPATIENT)
Dept: HEALTH INFORMATION MANAGEMENT | Facility: OTHER | Age: 66
End: 2021-09-10

## 2021-11-10 ENCOUNTER — TELEPHONE (OUTPATIENT)
Dept: MEDICAL GROUP | Facility: MEDICAL CENTER | Age: 66
End: 2021-11-10

## 2021-11-10 DIAGNOSIS — M17.0 PRIMARY OSTEOARTHRITIS OF KNEES, BILATERAL: ICD-10-CM

## 2021-11-11 NOTE — TELEPHONE ENCOUNTER
I had a communication in August and never received the information I needed in order to order the walker.  I will place the walker with the order of bilateral knee arthritis.  That is simply a guess on my part.  I am frustrated by the patient going through back channels without giving us a chance to do correct care.  I will place the walker order.  I called the number on file to see what kind of walker the patient wanted.  The [employer] message states that the phone number has been disconnected.  I see a note in August as a communication from Juliet Ray that the walker is too big and one of the wheels is not rolling correctly.   I reached out in August asking for clarification and per the documentation I just was told that the walker was requiring too much strength from her arms.  I feel that was not adequate.  I will write for a 4 wheeled walker and we will fax the request to [facility] home care.

## 2021-11-11 NOTE — TELEPHONE ENCOUNTER
----- Message from Kristy Muir, Med Ass't sent at 11/10/2021  3:51 PM PST -----  Regarding: Urgent - Order Needed  Gloria,    Spoke to Lelo, stated this is coming from Mehul Blanton (President) of [facility]. We need to put in a order for a walker for this patient. Lelo said to give the patient the works, [facility] will handle payment. Request order to DME for walker, please let me know if you need anything  else. pt information below:    Liliana Cifuentes  1955  MRN: [id number]  PH. telephone numbers do not work

## 2021-12-21 ENCOUNTER — HOSPITAL ENCOUNTER (OUTPATIENT)
Dept: LAB | Facility: MEDICAL CENTER | Age: 66
End: 2021-12-21
Attending: FAMILY MEDICINE
Payer: MEDICARE

## 2021-12-21 DIAGNOSIS — E53.8 VITAMIN B12 DEFICIENCY: ICD-10-CM

## 2021-12-21 DIAGNOSIS — E51.9 VITAMIN B1 DEFICIENCY: ICD-10-CM

## 2021-12-21 LAB
FOLATE SERPL-MCNC: 33 NG/ML
VIT B12 SERPL-MCNC: 2371 PG/ML (ref 211–911)

## 2021-12-21 PROCEDURE — 82746 ASSAY OF FOLIC ACID SERUM: CPT

## 2021-12-21 PROCEDURE — 84425 ASSAY OF VITAMIN B-1: CPT

## 2021-12-21 PROCEDURE — 82607 VITAMIN B-12: CPT

## 2021-12-21 PROCEDURE — 36415 COLL VENOUS BLD VENIPUNCTURE: CPT

## 2021-12-26 LAB — VIT B1 BLD-MCNC: 253 NMOL/L (ref 70–180)
